# Patient Record
Sex: MALE
[De-identification: names, ages, dates, MRNs, and addresses within clinical notes are randomized per-mention and may not be internally consistent; named-entity substitution may affect disease eponyms.]

---

## 2017-01-02 PROBLEM — N50.819 PAIN OF TESTES: Status: ACTIVE | Noted: 2017-01-02

## 2017-01-06 DIAGNOSIS — N50.819 PAIN OF TESTES: ICD-10-CM

## 2017-01-06 DIAGNOSIS — L93.0 LUPUS ERYTHEMATOSUS: ICD-10-CM

## 2017-01-06 PROCEDURE — 89325 SPERM ANTIBODY TEST: CPT

## 2017-01-09 LAB
ABNORMAL SPERM: 99 MORPHOLOGY
ABSTINENCE DAYS: 3 DAYS (ref 2–7)
AGGLUTINATION: ABNORMAL YES/NO
ANALYSIS TEMP - CENTIGRADE: 22 CENTIGRADE
CELL FRAGMENTS: ABNORMAL %
COLLECTION METHOD: ABNORMAL
COLLECTION SITE: ABNORMAL
CONSENT TO RELEASE TO PARTNER: NO
DIRECT IBT I: ABNORMAL
HEAD DEFECT: 99
IMMATURE SPERM: ABNORMAL %
IMMOTILE: 60 %
LAB RECEIPT TIME: ABNORMAL
LIQUEFIED: ABNORMAL YES/NO
MIDPIECE DEFECT: 37
NON-PROGRESSIVE MOTILITY: 6 %
NORMAL SPERM: 1 % NORMAL FORMS (ref 4–?)
PROGRESSIVE MOTILITY: 34 % (ref 32–?)
ROUND CELLS: 0.4 MILLION/ML (ref ?–2)
SPECIMEN CONCENTRATION: 14.2 MILLION/ML (ref 15–?)
SPECIMEN PH: 7.6 PH (ref 7.2–?)
SPECIMEN TYPE: ABNORMAL
SPECIMEN VOL UR: 4.8 ML (ref 1.5–?)
SPERM SURFACE IGG BOUND: 0 % (ref ?–49.9)
TAIL DEFECT: 12
TIME OF ANALYSIS: ABNORMAL
TOTAL NUMBER: 68.2 MILLION (ref 39–?)
TOTAL PROGRESSIVE MOTILE: 23.2 MILLION (ref 15.6–?)
VISCOUS: ABNORMAL YES/NO
VITALITY: 79 % (ref 58–?)
WBC SPECIMEN: ABNORMAL %

## 2017-01-18 NOTE — PROGRESS NOTES
Quick Note:    Dear Luke,   Here are your recent results.   Semen analysis is a little abnormal.  There is no evidence of infection or white blood cells, however.    Sperm count is a little low., but the semen volume is high so overall the total number of sperm is normal.  The sperm motility is excellent and there was not evidence of anti-sperm antibodies.    The morphology (sperm shape) is low, which is common in just about all men, but the lab specifically notes small or missing acrosomes, which the sperm needs to soften the egg to penetrate it. This could mean less chance of a pregnancy with intercourse, but a normal chance of pregnancy with IVF.    It doesn't look like testis pain is from infection, however, the best I can determine.    If we want to pursue fertility workup further I'd get some baseline hormone blood labs and a repeat semen analysis.  Let me know if you want to pursue this.    Thank You   Let me know if you have any questions.      Froilan Padron MD  ______

## 2020-03-10 ENCOUNTER — HEALTH MAINTENANCE LETTER (OUTPATIENT)
Age: 35
End: 2020-03-10

## 2020-12-27 ENCOUNTER — HEALTH MAINTENANCE LETTER (OUTPATIENT)
Age: 35
End: 2020-12-27

## 2021-04-24 ENCOUNTER — HEALTH MAINTENANCE LETTER (OUTPATIENT)
Age: 36
End: 2021-04-24

## 2021-10-09 ENCOUNTER — HEALTH MAINTENANCE LETTER (OUTPATIENT)
Age: 36
End: 2021-10-09

## 2022-05-16 ENCOUNTER — HEALTH MAINTENANCE LETTER (OUTPATIENT)
Age: 37
End: 2022-05-16

## 2022-09-11 ENCOUNTER — HEALTH MAINTENANCE LETTER (OUTPATIENT)
Age: 37
End: 2022-09-11

## 2023-10-07 ENCOUNTER — HEALTH MAINTENANCE LETTER (OUTPATIENT)
Age: 38
End: 2023-10-07